# Patient Record
Sex: MALE | Race: BLACK OR AFRICAN AMERICAN | NOT HISPANIC OR LATINO | Employment: OTHER | URBAN - METROPOLITAN AREA
[De-identification: names, ages, dates, MRNs, and addresses within clinical notes are randomized per-mention and may not be internally consistent; named-entity substitution may affect disease eponyms.]

---

## 2020-08-10 PROBLEM — R79.89 ELEVATED SERUM CREATININE: Status: ACTIVE | Noted: 2020-08-10

## 2020-08-10 PROBLEM — D75.1 POLYCYTHEMIA: Status: ACTIVE | Noted: 2020-08-10

## 2020-08-10 PROBLEM — I10 ESSENTIAL HYPERTENSION: Status: ACTIVE | Noted: 2020-08-10

## 2020-09-22 PROBLEM — E11.9 TYPE 2 DIABETES MELLITUS WITHOUT COMPLICATION, WITHOUT LONG-TERM CURRENT USE OF INSULIN: Status: ACTIVE | Noted: 2020-09-22

## 2020-09-22 PROBLEM — N40.0 BPH (BENIGN PROSTATIC HYPERPLASIA): Status: ACTIVE | Noted: 2020-09-22

## 2020-09-22 PROBLEM — I25.2 H/O ACUTE MYOCARDIAL INFARCTION: Status: ACTIVE | Noted: 2020-09-22

## 2020-10-20 PROBLEM — J44.9 COPD (CHRONIC OBSTRUCTIVE PULMONARY DISEASE): Chronic | Status: ACTIVE | Noted: 2020-10-20

## 2020-10-29 PROBLEM — J18.9 PNEUMONIA: Status: ACTIVE | Noted: 2020-10-29

## 2020-10-29 PROBLEM — N18.9 CHRONIC KIDNEY DISEASE: Status: ACTIVE | Noted: 2020-10-29

## 2020-10-29 PROBLEM — H49.9: Status: ACTIVE | Noted: 2020-10-29

## 2020-10-30 PROBLEM — G47.30 SLEEP APNEA: Status: ACTIVE | Noted: 2020-10-30

## 2020-10-30 PROBLEM — R06.02 SHORTNESS OF BREATH: Status: ACTIVE | Noted: 2020-10-30

## 2020-11-01 PROBLEM — J96.01 ACUTE RESPIRATORY FAILURE WITH HYPOXIA: Status: ACTIVE | Noted: 2020-10-30

## 2020-11-01 PROBLEM — J96.01 ACUTE RESPIRATORY FAILURE WITH HYPOXIA: Status: ACTIVE | Noted: 2020-11-01

## 2020-11-16 PROBLEM — Z87.09 HISTORY OF RESPIRATORY FAILURE: Status: ACTIVE | Noted: 2020-11-16

## 2020-11-16 PROBLEM — M16.12 PRIMARY OSTEOARTHRITIS OF LEFT HIP: Status: ACTIVE | Noted: 2020-11-16

## 2021-03-03 PROBLEM — E87.3 METABOLIC ALKALOSIS: Status: ACTIVE | Noted: 2021-03-03

## 2021-03-03 PROBLEM — E11.22 TYPE 2 DIABETES MELLITUS WITH STAGE 3B CHRONIC KIDNEY DISEASE, WITHOUT LONG-TERM CURRENT USE OF INSULIN: Status: ACTIVE | Noted: 2020-09-22

## 2021-03-03 PROBLEM — J96.11 CHRONIC RESPIRATORY FAILURE WITH HYPOXIA: Status: ACTIVE | Noted: 2021-03-03

## 2021-03-03 PROBLEM — N18.32 TYPE 2 DIABETES MELLITUS WITH STAGE 3B CHRONIC KIDNEY DISEASE, WITHOUT LONG-TERM CURRENT USE OF INSULIN: Status: ACTIVE | Noted: 2020-09-22

## 2021-03-22 PROBLEM — I25.2 H/O ACUTE MYOCARDIAL INFARCTION: Status: RESOLVED | Noted: 2020-09-22 | Resolved: 2021-03-22

## 2021-03-22 PROBLEM — R06.01 SLEEPS IN SITTING POSITION DUE TO ORTHOPNEA: Status: ACTIVE | Noted: 2021-03-22

## 2021-05-12 ENCOUNTER — PATIENT MESSAGE (OUTPATIENT)
Dept: RESEARCH | Facility: HOSPITAL | Age: 71
End: 2021-05-12

## 2021-06-08 PROBLEM — E87.5 HYPERKALEMIA: Status: ACTIVE | Noted: 2021-06-08

## 2021-10-13 PROBLEM — H26.9: Status: ACTIVE | Noted: 2021-10-13

## 2022-01-20 PROBLEM — U07.1 COVID-19: Status: ACTIVE | Noted: 2022-01-20

## 2022-01-20 PROBLEM — I24.9 ACS (ACUTE CORONARY SYNDROME): Status: ACTIVE | Noted: 2022-01-20

## 2022-01-20 PROBLEM — J96.21 ACUTE ON CHRONIC RESPIRATORY FAILURE WITH HYPOXIA AND HYPERCAPNIA: Status: ACTIVE | Noted: 2022-01-20

## 2022-01-20 PROBLEM — J44.9 COPD (CHRONIC OBSTRUCTIVE PULMONARY DISEASE): Status: ACTIVE | Noted: 2020-10-20

## 2022-01-20 PROBLEM — J96.22 ACUTE ON CHRONIC RESPIRATORY FAILURE WITH HYPOXIA AND HYPERCAPNIA: Status: ACTIVE | Noted: 2022-01-20

## 2022-01-21 PROBLEM — I50.32 CHRONIC DIASTOLIC CONGESTIVE HEART FAILURE: Status: ACTIVE | Noted: 2022-01-21

## 2022-01-21 PROBLEM — U07.1 THROMBOCYTOPENIA DUE TO COVID-19 VIRUS: Status: ACTIVE | Noted: 2022-01-21

## 2022-01-21 PROBLEM — D69.59 THROMBOCYTOPENIA DUE TO COVID-19 VIRUS: Status: ACTIVE | Noted: 2022-01-21

## 2022-01-21 PROBLEM — N18.9 ACUTE KIDNEY INJURY SUPERIMPOSED ON CHRONIC KIDNEY DISEASE: Status: ACTIVE | Noted: 2022-01-21

## 2022-01-21 PROBLEM — G93.40 ACUTE ENCEPHALOPATHY: Status: ACTIVE | Noted: 2022-01-21

## 2022-01-21 PROBLEM — N17.9 ACUTE KIDNEY INJURY SUPERIMPOSED ON CHRONIC KIDNEY DISEASE: Status: ACTIVE | Noted: 2022-01-21

## 2022-01-22 PROBLEM — D72.819 LEUKOPENIA: Status: ACTIVE | Noted: 2022-01-22

## 2022-01-23 PROBLEM — R00.1 BRADYCARDIA: Status: ACTIVE | Noted: 2022-01-23

## 2022-01-23 PROBLEM — I24.9 ACS (ACUTE CORONARY SYNDROME): Status: RESOLVED | Noted: 2022-01-20 | Resolved: 2022-01-23

## 2022-01-25 DIAGNOSIS — U07.1 COVID-19 VIRUS DETECTED: ICD-10-CM

## 2022-01-26 ENCOUNTER — PATIENT MESSAGE (OUTPATIENT)
Dept: ADMINISTRATIVE | Facility: CLINIC | Age: 72
End: 2022-01-26

## 2022-08-11 PROBLEM — J96.01 ACUTE HYPOXEMIC RESPIRATORY FAILURE: Status: RESOLVED | Noted: 2020-10-30 | Resolved: 2022-08-11

## 2022-08-11 PROBLEM — J96.22 ACUTE ON CHRONIC RESPIRATORY FAILURE WITH HYPOXIA AND HYPERCAPNIA: Status: RESOLVED | Noted: 2022-01-20 | Resolved: 2022-08-11

## 2022-08-11 PROBLEM — E87.3 METABOLIC ALKALOSIS: Status: RESOLVED | Noted: 2021-03-03 | Resolved: 2022-08-11

## 2022-08-11 PROBLEM — E87.5 HYPERKALEMIA: Status: RESOLVED | Noted: 2021-06-08 | Resolved: 2022-08-11

## 2022-08-11 PROBLEM — J96.21 ACUTE ON CHRONIC RESPIRATORY FAILURE WITH HYPOXIA AND HYPERCAPNIA: Status: RESOLVED | Noted: 2022-01-20 | Resolved: 2022-08-11

## 2023-02-21 PROBLEM — N17.9 ACUTE KIDNEY INJURY SUPERIMPOSED ON CHRONIC KIDNEY DISEASE: Status: RESOLVED | Noted: 2022-01-21 | Resolved: 2023-02-21

## 2023-02-21 PROBLEM — U07.1 THROMBOCYTOPENIA DUE TO COVID-19 VIRUS: Status: RESOLVED | Noted: 2022-01-21 | Resolved: 2023-02-21

## 2023-02-21 PROBLEM — G93.40 ACUTE ENCEPHALOPATHY: Status: RESOLVED | Noted: 2022-01-21 | Resolved: 2023-02-21

## 2023-02-21 PROBLEM — D69.59 THROMBOCYTOPENIA DUE TO COVID-19 VIRUS: Status: RESOLVED | Noted: 2022-01-21 | Resolved: 2023-02-21

## 2023-02-21 PROBLEM — I20.89 ANGINAL CHEST PAIN AT REST: Status: ACTIVE | Noted: 2023-02-21

## 2023-02-21 PROBLEM — N18.9 ACUTE KIDNEY INJURY SUPERIMPOSED ON CHRONIC KIDNEY DISEASE: Status: RESOLVED | Noted: 2022-01-21 | Resolved: 2023-02-21

## 2023-02-21 PROBLEM — H49.9: Status: RESOLVED | Noted: 2020-10-29 | Resolved: 2023-02-21

## 2023-02-21 PROBLEM — U07.1 COVID-19: Status: RESOLVED | Noted: 2022-01-20 | Resolved: 2023-02-21

## 2023-07-21 PROBLEM — R06.02 SOB (SHORTNESS OF BREATH): Status: ACTIVE | Noted: 2023-07-21

## 2023-07-21 PROBLEM — M79.89 LEG SWELLING: Status: ACTIVE | Noted: 2023-07-21

## 2023-07-21 PROBLEM — J44.1 COPD EXACERBATION: Status: ACTIVE | Noted: 2020-10-20

## 2023-07-21 PROBLEM — Z87.891 HISTORY OF TOBACCO ABUSE: Status: ACTIVE | Noted: 2023-07-21

## 2023-07-22 PROBLEM — J96.12 CHRONIC RESPIRATORY FAILURE WITH HYPOXIA AND HYPERCAPNIA: Status: RESOLVED | Noted: 2023-07-22 | Resolved: 2023-07-22

## 2023-07-22 PROBLEM — J96.22 ACUTE ON CHRONIC RESPIRATORY FAILURE WITH HYPOXIA AND HYPERCAPNIA: Status: RESOLVED | Noted: 2022-01-20 | Resolved: 2023-07-22

## 2023-07-22 PROBLEM — J96.12 CHRONIC RESPIRATORY FAILURE WITH HYPERCAPNIA: Status: ACTIVE | Noted: 2023-07-22

## 2023-07-22 PROBLEM — J44.1 COPD EXACERBATION: Status: RESOLVED | Noted: 2020-10-20 | Resolved: 2023-07-22

## 2023-07-22 PROBLEM — J96.11 CHRONIC RESPIRATORY FAILURE WITH HYPOXIA AND HYPERCAPNIA: Status: ACTIVE | Noted: 2023-07-22

## 2023-07-22 PROBLEM — J44.9 COPD (CHRONIC OBSTRUCTIVE PULMONARY DISEASE): Status: ACTIVE | Noted: 2023-07-22

## 2023-07-22 PROBLEM — N17.9 ACUTE KIDNEY INJURY SUPERIMPOSED ON CKD: Status: RESOLVED | Noted: 2022-01-21 | Resolved: 2023-07-22

## 2023-07-22 PROBLEM — N18.9 ACUTE KIDNEY INJURY SUPERIMPOSED ON CKD: Status: RESOLVED | Noted: 2022-01-21 | Resolved: 2023-07-22

## 2023-07-22 PROBLEM — J96.11 CHRONIC RESPIRATORY FAILURE WITH HYPOXIA AND HYPERCAPNIA: Status: RESOLVED | Noted: 2023-07-22 | Resolved: 2023-07-22

## 2023-07-22 PROBLEM — N18.31 STAGE 3A CHRONIC KIDNEY DISEASE: Status: ACTIVE | Noted: 2023-07-22

## 2023-07-22 PROBLEM — J96.21 ACUTE ON CHRONIC RESPIRATORY FAILURE WITH HYPOXIA AND HYPERCAPNIA: Status: RESOLVED | Noted: 2022-01-20 | Resolved: 2023-07-22

## 2023-07-22 PROBLEM — E87.6 HYPOKALEMIA: Status: ACTIVE | Noted: 2023-07-22

## 2023-07-22 PROBLEM — N18.30 CHRONIC KIDNEY DISEASE, STAGE III (MODERATE): Status: ACTIVE | Noted: 2023-07-22

## 2023-07-24 PROBLEM — J96.22 ACUTE ON CHRONIC RESPIRATORY FAILURE WITH HYPOXIA AND HYPERCAPNIA: Status: RESOLVED | Noted: 2022-01-20 | Resolved: 2023-07-24

## 2023-07-24 PROBLEM — J96.21 ACUTE ON CHRONIC RESPIRATORY FAILURE WITH HYPOXIA AND HYPERCAPNIA: Status: RESOLVED | Noted: 2022-01-20 | Resolved: 2023-07-24

## 2023-07-24 PROBLEM — J44.1 COPD EXACERBATION: Status: RESOLVED | Noted: 2020-10-20 | Resolved: 2023-07-24

## 2023-07-25 ENCOUNTER — PATIENT OUTREACH (OUTPATIENT)
Dept: ADMINISTRATIVE | Facility: CLINIC | Age: 73
End: 2023-07-25

## 2023-07-25 NOTE — PROGRESS NOTES
C3 nurse spoke with James Dean for a TCC post hospital discharge follow up call. The patient has a scheduled HOSFU appointment with Kali Moeller MD on 07/28/23 @ 6426.

## 2024-07-30 PROBLEM — J96.12 ACUTE HYPOXIC ON CHRONIC HYPERCAPNIC RESPIRATORY FAILURE: Status: ACTIVE | Noted: 2024-07-30

## 2024-07-30 PROBLEM — J96.01 ACUTE HYPOXIC ON CHRONIC HYPERCAPNIC RESPIRATORY FAILURE: Status: ACTIVE | Noted: 2024-07-30

## 2024-07-31 PROBLEM — J96.12 CHRONIC RESPIRATORY FAILURE WITH HYPOXIA AND HYPERCAPNIA: Status: ACTIVE | Noted: 2024-07-31

## 2024-07-31 PROBLEM — J44.1 COPD EXACERBATION: Status: ACTIVE | Noted: 2024-07-31

## 2024-07-31 PROBLEM — I50.9 CONGESTIVE HEART FAILURE: Status: ACTIVE | Noted: 2024-07-31

## 2024-07-31 PROBLEM — J96.11 CHRONIC RESPIRATORY FAILURE WITH HYPOXIA AND HYPERCAPNIA: Status: ACTIVE | Noted: 2024-07-31

## 2024-08-02 ENCOUNTER — PATIENT OUTREACH (OUTPATIENT)
Dept: ADMINISTRATIVE | Facility: CLINIC | Age: 74
End: 2024-08-02

## 2024-08-02 NOTE — PROGRESS NOTES
C3 nurse attempted to contact James Dean  for a TCC post hospital discharge follow up call. No answer. Left voicemail with callback information. The patient has a scheduled HOSFU appointment with Deisy Coffey on 8/14/24 @ 6921.

## 2024-08-14 PROBLEM — D69.6 THROMBOCYTOPENIA, UNSPECIFIED: Status: ACTIVE | Noted: 2022-01-21

## 2024-08-14 PROBLEM — J96.01 ACUTE HYPOXIC ON CHRONIC HYPERCAPNIC RESPIRATORY FAILURE: Status: RESOLVED | Noted: 2024-07-30 | Resolved: 2024-08-14

## 2024-08-14 PROBLEM — E87.6 HYPOKALEMIA: Status: RESOLVED | Noted: 2023-07-22 | Resolved: 2024-08-14

## 2024-08-14 PROBLEM — I20.89 ANGINAL CHEST PAIN AT REST: Status: RESOLVED | Noted: 2023-02-21 | Resolved: 2024-08-14

## 2024-08-14 PROBLEM — J96.12 ACUTE HYPOXIC ON CHRONIC HYPERCAPNIC RESPIRATORY FAILURE: Status: RESOLVED | Noted: 2024-07-30 | Resolved: 2024-08-14

## 2024-08-14 PROBLEM — J44.1 COPD EXACERBATION: Status: RESOLVED | Noted: 2024-07-31 | Resolved: 2024-08-14

## 2024-10-02 ENCOUNTER — PATIENT OUTREACH (OUTPATIENT)
Dept: ADMINISTRATIVE | Facility: HOSPITAL | Age: 74
End: 2024-10-02

## 2024-10-02 DIAGNOSIS — E11.22 TYPE 2 DIABETES MELLITUS WITH STAGE 3B CHRONIC KIDNEY DISEASE, WITHOUT LONG-TERM CURRENT USE OF INSULIN: Primary | ICD-10-CM

## 2024-10-02 DIAGNOSIS — N18.32 TYPE 2 DIABETES MELLITUS WITH STAGE 3B CHRONIC KIDNEY DISEASE, WITHOUT LONG-TERM CURRENT USE OF INSULIN: Primary | ICD-10-CM
